# Patient Record
Sex: FEMALE | Race: WHITE
[De-identification: names, ages, dates, MRNs, and addresses within clinical notes are randomized per-mention and may not be internally consistent; named-entity substitution may affect disease eponyms.]

---

## 2018-04-13 ENCOUNTER — HOSPITAL ENCOUNTER (EMERGENCY)
Dept: HOSPITAL 5 - ED | Age: 28
Discharge: LEFT BEFORE DISCHARGE | End: 2018-04-13
Payer: SELF-PAY

## 2018-04-13 VITALS — DIASTOLIC BLOOD PRESSURE: 56 MMHG | SYSTOLIC BLOOD PRESSURE: 113 MMHG

## 2018-04-13 DIAGNOSIS — B37.9: Primary | ICD-10-CM

## 2018-04-13 DIAGNOSIS — Z53.21: ICD-10-CM

## 2018-04-13 NOTE — EMERGENCY DEPARTMENT REPORT
Chief Complaint: Urogenital-Female


Stated Complaint: YEAST INFECTION





- HPI


History of Present Illness: 





27 year after [place] female comes in for thick white cottage cheese discharge 

with itching.  Patient reports that she wore shorts that were dark colored 

yesterday and today she started having itching in her vaginal area.  Patient 

denies any pelvic pain denies any abdominal pain denies any fever chills no 

nausea no vomiting no vaginal discharge with smell.





- Exam


Vital Signs: 


 Vital Signs











  04/13/18





  12:49


 


Temperature 98.1 F


 


Pulse Rate 59 L


 


Respiratory 16





Rate 


 


Blood Pressure 113/56


 


O2 Sat by Pulse 99





Oximetry 











Physical Exam: 





Patient is evaluated by this provider.  Patient stable to follow up with her 

GYN or [facility] for a a yeast infection.  Patient also try over-

the-counter Monistat for treatment.


MSE screening note: 


Focused history and physical exam performed.


Due to findings the following was ordered:





Patient is stable to be discharged for nonemergent exam.  Patient follow-up 

with [facility] or the health department she can also try over-

the-counter Monistat.





ED Disposition for MSE


Disposition: Z-07 MED SCREENING EXAM-LEFT


Condition: Stable


Referrals: 


PRIMARY CARE,MD [Primary Care Provider] - 3-5 Days